# Patient Record
Sex: FEMALE | Race: WHITE | Employment: UNEMPLOYED | URBAN - METROPOLITAN AREA
[De-identification: names, ages, dates, MRNs, and addresses within clinical notes are randomized per-mention and may not be internally consistent; named-entity substitution may affect disease eponyms.]

---

## 2017-04-20 PROBLEM — R42 VERTIGO: Status: ACTIVE | Noted: 2017-04-20

## 2017-04-20 PROBLEM — S00.411A ABRASION OF RIGHT EAR CANAL: Status: ACTIVE | Noted: 2017-04-20

## 2017-05-04 PROBLEM — Z00.01 ENCOUNTER FOR GENERAL ADULT MEDICAL EXAMINATION WITH ABNORMAL FINDINGS: Status: ACTIVE | Noted: 2017-05-04

## 2017-05-04 PROBLEM — L23.7 ALLERGIC CONTACT DERMATITIS DUE TO PLANTS, EXCEPT FOOD: Status: ACTIVE | Noted: 2017-05-04

## 2018-08-24 ENCOUNTER — OFFICE VISIT (OUTPATIENT)
Dept: FAMILY MEDICINE CLINIC | Age: 58
End: 2018-08-24
Payer: COMMERCIAL

## 2018-08-24 VITALS
TEMPERATURE: 98.2 F | SYSTOLIC BLOOD PRESSURE: 124 MMHG | HEIGHT: 62 IN | DIASTOLIC BLOOD PRESSURE: 78 MMHG | RESPIRATION RATE: 16 BRPM | WEIGHT: 154 LBS | HEART RATE: 84 BPM | OXYGEN SATURATION: 96 % | BODY MASS INDEX: 28.34 KG/M2

## 2018-08-24 DIAGNOSIS — J01.00 ACUTE NON-RECURRENT MAXILLARY SINUSITIS: Primary | ICD-10-CM

## 2018-08-24 DIAGNOSIS — L73.9 FOLLICULITIS: ICD-10-CM

## 2018-08-24 PROCEDURE — 99213 OFFICE O/P EST LOW 20 MIN: CPT | Performed by: FAMILY MEDICINE

## 2018-08-24 RX ORDER — CEFDINIR 300 MG/1
600 CAPSULE ORAL DAILY
Qty: 20 CAPSULE | Refills: 0 | Status: SHIPPED | OUTPATIENT
Start: 2018-08-24 | End: 2018-08-24 | Stop reason: SDUPTHER

## 2018-08-24 RX ORDER — CEFDINIR 300 MG/1
600 CAPSULE ORAL DAILY
Qty: 20 CAPSULE | Refills: 0 | Status: SHIPPED | OUTPATIENT
Start: 2018-08-24 | End: 2018-09-03

## 2018-08-24 RX ORDER — VENLAFAXINE HYDROCHLORIDE 75 MG/1
75 CAPSULE, EXTENDED RELEASE ORAL DAILY
Qty: 90 CAPSULE | Refills: 3 | Status: SHIPPED | OUTPATIENT
Start: 2018-08-24 | End: 2019-08-13 | Stop reason: SDUPTHER

## 2018-08-24 ASSESSMENT — ENCOUNTER SYMPTOMS
SINUS PRESSURE: 1
DIARRHEA: 0
SHORTNESS OF BREATH: 0
EYE REDNESS: 0
VOMITING: 0
CONSTIPATION: 0
ABDOMINAL PAIN: 0
WHEEZING: 0
APNEA: 0
RHINORRHEA: 0
BLOOD IN STOOL: 0
BACK PAIN: 0
CHEST TIGHTNESS: 0
COUGH: 1
NAUSEA: 0
SORE THROAT: 0
COLOR CHANGE: 0
SWOLLEN GLANDS: 0
EYE ITCHING: 0
EYE PAIN: 0
SINUS COMPLAINT: 1

## 2018-08-24 ASSESSMENT — PATIENT HEALTH QUESTIONNAIRE - PHQ9
SUM OF ALL RESPONSES TO PHQ9 QUESTIONS 1 & 2: 0
SUM OF ALL RESPONSES TO PHQ QUESTIONS 1-9: 0
SUM OF ALL RESPONSES TO PHQ QUESTIONS 1-9: 0
1. LITTLE INTEREST OR PLEASURE IN DOING THINGS: 0
2. FEELING DOWN, DEPRESSED OR HOPELESS: 0

## 2018-08-24 NOTE — PROGRESS NOTES
Chief Complaint:     Chief Complaint   Patient presents with    Sinus Problem     have been bothering her for weeks.  Mass     under right arm, just noticed it today         Sinus Problem   This is a new problem. The current episode started in the past 7 days. The problem is unchanged. There has been no fever. The pain is mild. Associated symptoms include congestion, coughing and sinus pressure. Pertinent negatives include no ear pain, headaches, neck pain, shortness of breath, sore throat or swollen glands. Past treatments include nothing. The treatment provided no relief. Other   This is a new (lump on skin under right armpit) problem. The current episode started today. The problem has been unchanged. Associated symptoms include congestion and coughing. Pertinent negatives include no abdominal pain, arthralgias, chest pain, fatigue, fever, headaches, myalgias, nausea, neck pain, numbness, rash, sore throat, swollen glands, vomiting or weakness. Nothing aggravates the symptoms. She has tried nothing for the symptoms. The treatment provided no relief.        Patient Active Problem List   Diagnosis    Acute non-recurrent maxillary sinusitis    Vertigo    Abrasion of right ear canal    Encounter for general adult medical examination with abnormal findings    Allergic contact dermatitis due to plants, except food    Folliculitis       Past Medical History:   Diagnosis Date    Hyperlipidemia     Nicotine dependence     Palpitations        Past Surgical History:   Procedure Laterality Date    EYE SURGERY Left 11/02/2016       Current Outpatient Prescriptions   Medication Sig Dispense Refill    cefdinir (OMNICEF) 300 MG capsule Take 2 capsules by mouth daily for 10 days 20 capsule 0    venlafaxine (EFFEXOR XR) 75 MG extended release capsule Take 1 capsule by mouth daily 90 capsule 3    NALTREXONE HCL PO Take 4.5 mg by mouth daily      meclizine (ANTIVERT) 25 MG tablet Take 25 mg by mouth 3 times daily as needed       No current facility-administered medications for this visit. No Known Allergies    Social History     Social History    Marital status:      Spouse name: N/A    Number of children: N/A    Years of education: N/A     Social History Main Topics    Smoking status: Current Every Day Smoker     Packs/day: 2.00     Years: 30.00    Smokeless tobacco: Never Used    Alcohol use None    Drug use: Unknown    Sexual activity: Not Asked     Other Topics Concern    None     Social History Narrative    None       History reviewed. No pertinent family history. Review of Systems   Constitutional: Negative for activity change, appetite change, fatigue and fever. HENT: Positive for congestion and sinus pressure. Negative for ear pain, hearing loss, nosebleeds, rhinorrhea and sore throat. Eyes: Negative for pain, redness, itching and visual disturbance. Respiratory: Positive for cough. Negative for apnea, chest tightness, shortness of breath and wheezing. Cardiovascular: Negative for chest pain, palpitations and leg swelling. Gastrointestinal: Negative for abdominal pain, blood in stool, constipation, diarrhea, nausea and vomiting. Endocrine: Negative. Genitourinary: Negative for decreased urine volume, difficulty urinating, dysuria, frequency, hematuria and urgency. Musculoskeletal: Negative for arthralgias, back pain, gait problem, myalgias and neck pain. Skin: Negative for color change and rash. Allergic/Immunologic: Negative for environmental allergies and food allergies. Neurological: Negative for dizziness, weakness, light-headedness, numbness and headaches. Hematological: Negative for adenopathy. Does not bruise/bleed easily. Psychiatric/Behavioral: Negative for behavioral problems, dysphoric mood and sleep disturbance. The patient is not nervous/anxious and is not hyperactive. All other systems reviewed and are negative.         /78   Pulse 84 Temp 98.2 °F (36.8 °C)   Resp 16   Ht 5' 2\" (1.575 m)   Wt 154 lb (69.9 kg)   SpO2 96%   BMI 28.17 kg/m²     Physical Exam   Constitutional: She is oriented to person, place, and time. She appears well-developed and well-nourished. HENT:   Head: Normocephalic and atraumatic. Right Ear: External ear normal.   Left Ear: External ear normal.   Nose: Nose normal.   Mouth/Throat: Oropharynx is clear and moist.   Eyes: Pupils are equal, round, and reactive to light. Conjunctivae and EOM are normal. No scleral icterus. Neck: Normal range of motion. Neck supple. No thyromegaly present. Cardiovascular: Normal rate, regular rhythm and normal heart sounds. No murmur heard. Pulmonary/Chest: Effort normal and breath sounds normal. No respiratory distress. She has no wheezes. She has no rales. Abdominal: Soft. Bowel sounds are normal. She exhibits no distension. There is no tenderness. Musculoskeletal: Normal range of motion. She exhibits no edema or tenderness. Lymphadenopathy:     She has no cervical adenopathy. Neurological: She is alert and oriented to person, place, and time. She has normal reflexes. No cranial nerve deficit. Skin: Skin is warm and dry. No rash noted. No erythema. Psychiatric: She has a normal mood and affect. Nursing note and vitals reviewed. ASSESSMENT/PLAN:    Patient Active Problem List   Diagnosis    Acute non-recurrent maxillary sinusitis    Vertigo    Abrasion of right ear canal    Encounter for general adult medical examination with abnormal findings    Allergic contact dermatitis due to plants, except food    Folliculitis       Geovanny Geller was seen today for sinus problem and mass. Diagnoses and all orders for this visit:    Acute non-recurrent maxillary sinusitis    Folliculitis    Other orders  -     cefdinir (OMNICEF) 300 MG capsule;  Take 2 capsules by mouth daily for 10 days          Return if symptoms worsen or fail to improve.         Cristobal Hernandes DO  8/24/2018  12:15 PM

## 2018-09-07 ENCOUNTER — OFFICE VISIT (OUTPATIENT)
Dept: FAMILY MEDICINE CLINIC | Age: 58
End: 2018-09-07
Payer: COMMERCIAL

## 2018-09-07 VITALS
HEIGHT: 62 IN | OXYGEN SATURATION: 96 % | WEIGHT: 154 LBS | RESPIRATION RATE: 20 BRPM | SYSTOLIC BLOOD PRESSURE: 132 MMHG | HEART RATE: 75 BPM | BODY MASS INDEX: 28.34 KG/M2 | TEMPERATURE: 98.1 F | DIASTOLIC BLOOD PRESSURE: 80 MMHG

## 2018-09-07 DIAGNOSIS — J01.00 ACUTE NON-RECURRENT MAXILLARY SINUSITIS: Primary | ICD-10-CM

## 2018-09-07 PROCEDURE — 99213 OFFICE O/P EST LOW 20 MIN: CPT | Performed by: FAMILY MEDICINE

## 2018-09-07 RX ORDER — ROSUVASTATIN CALCIUM 20 MG/1
20 TABLET, COATED ORAL DAILY
COMMUNITY
End: 2019-04-23

## 2018-09-07 RX ORDER — PREDNISONE 10 MG/1
TABLET ORAL
Qty: 18 TABLET | Refills: 0 | Status: SHIPPED | OUTPATIENT
Start: 2018-09-07 | End: 2019-04-23

## 2018-09-07 RX ORDER — LEVOFLOXACIN 500 MG/1
500 TABLET, FILM COATED ORAL DAILY
Qty: 10 TABLET | Refills: 0 | Status: SHIPPED | OUTPATIENT
Start: 2018-09-07 | End: 2018-09-17

## 2018-09-07 ASSESSMENT — ENCOUNTER SYMPTOMS
DIARRHEA: 0
BLOOD IN STOOL: 0
SINUS PAIN: 1
COLOR CHANGE: 0
SORE THROAT: 0
SINUS PRESSURE: 0
RHINORRHEA: 1
NAUSEA: 0
COUGH: 1
CONSTIPATION: 0
EYE REDNESS: 0
APNEA: 0
BACK PAIN: 0
ABDOMINAL PAIN: 0
SHORTNESS OF BREATH: 0
VOMITING: 0
EYE PAIN: 0
EYE ITCHING: 0
CHEST TIGHTNESS: 0
WHEEZING: 0

## 2018-09-07 NOTE — PROGRESS NOTES
Chief Complaint:     Chief Complaint   Patient presents with    URI     not feeling any better after antibiotic but lump under arm is gone         URI    This is a recurrent problem. The current episode started 1 to 4 weeks ago. The problem has been waxing and waning. There has been no fever. Associated symptoms include congestion, coughing, rhinorrhea and sinus pain. Pertinent negatives include no abdominal pain, chest pain, diarrhea, dysuria, ear pain, headaches, nausea, neck pain, rash, sore throat, vomiting or wheezing. She has tried nothing for the symptoms. The treatment provided no relief. Patient Active Problem List   Diagnosis    Acute non-recurrent maxillary sinusitis    Vertigo    Abrasion of right ear canal    Encounter for general adult medical examination with abnormal findings    Allergic contact dermatitis due to plants, except food    Folliculitis       Past Medical History:   Diagnosis Date    Hyperlipidemia     Nicotine dependence     Palpitations        Past Surgical History:   Procedure Laterality Date    EYE SURGERY Left 11/02/2016       Current Outpatient Prescriptions   Medication Sig Dispense Refill    rosuvastatin (CRESTOR) 20 MG tablet Take 20 mg by mouth daily      levofloxacin (LEVAQUIN) 500 MG tablet Take 1 tablet by mouth daily for 10 days 10 tablet 0    predniSONE (DELTASONE) 10 MG tablet 30mg daily x3 days, then 20mg daily x3 days, then 10mg daily x3 days 18 tablet 0    venlafaxine (EFFEXOR XR) 75 MG extended release capsule Take 1 capsule by mouth daily 90 capsule 3    NALTREXONE HCL PO Take 4.5 mg by mouth daily      meclizine (ANTIVERT) 25 MG tablet Take 25 mg by mouth 3 times daily as needed       No current facility-administered medications for this visit.         No Known Allergies    Social History     Social History    Marital status:      Spouse name: N/A    Number of children: N/A    Years of education: N/A     Social History Main Topics    Smoking status: Current Every Day Smoker     Packs/day: 2.00     Years: 30.00    Smokeless tobacco: Never Used    Alcohol use None    Drug use: Unknown    Sexual activity: Not Asked     Other Topics Concern    None     Social History Narrative    None       History reviewed. No pertinent family history. Review of Systems   Constitutional: Negative for activity change, appetite change, fatigue and fever. HENT: Positive for congestion, rhinorrhea and sinus pain. Negative for ear pain, hearing loss, nosebleeds, sinus pressure and sore throat. Eyes: Negative for pain, redness, itching and visual disturbance. Respiratory: Positive for cough. Negative for apnea, chest tightness, shortness of breath and wheezing. Cardiovascular: Negative for chest pain, palpitations and leg swelling. Gastrointestinal: Negative for abdominal pain, blood in stool, constipation, diarrhea, nausea and vomiting. Endocrine: Negative. Genitourinary: Negative for decreased urine volume, difficulty urinating, dysuria, frequency, hematuria and urgency. Musculoskeletal: Negative for arthralgias, back pain, gait problem, myalgias and neck pain. Skin: Negative for color change and rash. Allergic/Immunologic: Negative for environmental allergies and food allergies. Neurological: Negative for dizziness, weakness, light-headedness, numbness and headaches. Hematological: Negative for adenopathy. Does not bruise/bleed easily. Psychiatric/Behavioral: Negative for behavioral problems, dysphoric mood and sleep disturbance. The patient is not nervous/anxious and is not hyperactive. All other systems reviewed and are negative. /80   Pulse 75   Temp 98.1 °F (36.7 °C) (Oral)   Resp 20   Ht 5' 2\" (1.575 m)   Wt 154 lb (69.9 kg)   SpO2 96%   BMI 28.17 kg/m²     Physical Exam   Constitutional: She appears well-developed and well-nourished. Non-toxic appearance.    HENT:   Head: Normocephalic and atraumatic. Right Ear: Ear canal normal. A middle ear effusion is present. Left Ear: Ear canal normal. A middle ear effusion is present. Nose: Mucosal edema, rhinorrhea and sinus tenderness present. No nasal deformity or septal deviation. No foreign bodies. Right sinus exhibits maxillary sinus tenderness. Left sinus exhibits maxillary sinus tenderness. Mouth/Throat: Uvula is midline. Mucous membranes are not pale and not dry. Posterior oropharyngeal erythema present. No oropharyngeal exudate or tonsillar abscesses. Eyes: Conjunctivae and EOM are normal. Right eye exhibits no discharge. Left eye exhibits no discharge. Right conjunctiva is not injected. Left conjunctiva is not injected. Right eye exhibits normal extraocular motion. Left eye exhibits normal extraocular motion. Neck: Normal range of motion. Neck supple. Cardiovascular: Normal rate, regular rhythm and normal heart sounds. No murmur heard. Pulmonary/Chest: Effort normal. No respiratory distress. She has no wheezes. She has no rales. She exhibits no tenderness. Abdominal: Soft. Bowel sounds are normal. There is no tenderness. Lymphadenopathy:     She has no cervical adenopathy. Neurological: She is alert. Skin: Skin is warm and dry. No rash noted. No erythema. Nursing note and vitals reviewed. ASSESSMENT/PLAN:    Patient Active Problem List   Diagnosis    Acute non-recurrent maxillary sinusitis    Vertigo    Abrasion of right ear canal    Encounter for general adult medical examination with abnormal findings    Allergic contact dermatitis due to plants, except food    Folliculitis       Andrés Mcfarland was seen today for uri. Diagnoses and all orders for this visit:    Acute non-recurrent maxillary sinusitis    Other orders  -     levofloxacin (LEVAQUIN) 500 MG tablet;  Take 1 tablet by mouth daily for 10 days  -     predniSONE (DELTASONE) 10 MG tablet; 30mg daily x3 days, then 20mg daily x3 days, then 10mg daily x3 days          Return in about 1 month (around 10/7/2018) for [address] labs for cholesterol.         Brendan Man DO  9/7/2018  10:24 AM

## 2018-09-26 PROBLEM — Z00.01 ENCOUNTER FOR GENERAL ADULT MEDICAL EXAMINATION WITH ABNORMAL FINDINGS: Status: RESOLVED | Noted: 2017-05-04 | Resolved: 2018-09-26

## 2019-04-23 ENCOUNTER — OFFICE VISIT (OUTPATIENT)
Dept: FAMILY MEDICINE CLINIC | Age: 59
End: 2019-04-23
Payer: COMMERCIAL

## 2019-04-23 VITALS
WEIGHT: 167 LBS | BODY MASS INDEX: 30.73 KG/M2 | HEIGHT: 62 IN | OXYGEN SATURATION: 96 % | DIASTOLIC BLOOD PRESSURE: 88 MMHG | TEMPERATURE: 97.8 F | SYSTOLIC BLOOD PRESSURE: 134 MMHG | HEART RATE: 80 BPM

## 2019-04-23 DIAGNOSIS — J01.00 ACUTE NON-RECURRENT MAXILLARY SINUSITIS: Primary | ICD-10-CM

## 2019-04-23 DIAGNOSIS — Z12.11 SCREENING FOR MALIGNANT NEOPLASM OF COLON: ICD-10-CM

## 2019-04-23 DIAGNOSIS — Z12.31 ENCOUNTER FOR SCREENING MAMMOGRAM FOR MALIGNANT NEOPLASM OF BREAST: ICD-10-CM

## 2019-04-23 PROCEDURE — 99213 OFFICE O/P EST LOW 20 MIN: CPT | Performed by: FAMILY MEDICINE

## 2019-04-23 RX ORDER — CEFDINIR 300 MG/1
600 CAPSULE ORAL DAILY
Qty: 20 CAPSULE | Refills: 0 | Status: SHIPPED | OUTPATIENT
Start: 2019-04-23 | End: 2019-05-03

## 2019-04-23 RX ORDER — PREDNISONE 10 MG/1
TABLET ORAL
Qty: 18 TABLET | Refills: 0 | Status: SHIPPED | OUTPATIENT
Start: 2019-04-23 | End: 2019-08-13

## 2019-04-23 ASSESSMENT — ENCOUNTER SYMPTOMS
SINUS PRESSURE: 1
NAUSEA: 0
VOMITING: 0
SHORTNESS OF BREATH: 0
COUGH: 1
DIARRHEA: 0
SORE THROAT: 1
RHINORRHEA: 1
EYE REDNESS: 0
WHEEZING: 0

## 2019-04-23 ASSESSMENT — PATIENT HEALTH QUESTIONNAIRE - PHQ9
SUM OF ALL RESPONSES TO PHQ QUESTIONS 1-9: 0
2. FEELING DOWN, DEPRESSED OR HOPELESS: 0
1. LITTLE INTEREST OR PLEASURE IN DOING THINGS: 0
SUM OF ALL RESPONSES TO PHQ QUESTIONS 1-9: 0
SUM OF ALL RESPONSES TO PHQ9 QUESTIONS 1 & 2: 0

## 2019-04-23 NOTE — PROGRESS NOTES
Chief Complaint:     Chief Complaint   Patient presents with    Cough    Otalgia     right ear, onset about a week    Headache    Fever         Cough   This is a new problem. The current episode started in the past 7 days. The problem has been unchanged. The cough is non-productive. Associated symptoms include ear congestion, ear pain, a fever, headaches, nasal congestion, postnasal drip, rhinorrhea and a sore throat. Pertinent negatives include no chills, eye redness, rash, shortness of breath or wheezing. She has tried OTC cough suppressant for the symptoms. The treatment provided no relief. Otalgia    There is pain in both ears. This is a new problem. The current episode started in the past 7 days. The problem has been unchanged. The maximum temperature recorded prior to her arrival was 100.4 - 100.9 F. Associated symptoms include coughing, headaches, rhinorrhea and a sore throat. Pertinent negatives include no diarrhea, ear discharge, rash or vomiting. She has tried nothing for the symptoms. The treatment provided no relief.        Patient Active Problem List   Diagnosis    Acute non-recurrent maxillary sinusitis    Vertigo    Abrasion of right ear canal    Allergic contact dermatitis due to plants, except food    Folliculitis       Past Medical History:   Diagnosis Date    Hyperlipidemia     Nicotine dependence     Palpitations        Past Surgical History:   Procedure Laterality Date    EYE SURGERY Left 11/02/2016       Current Outpatient Medications   Medication Sig Dispense Refill    cefdinir (OMNICEF) 300 MG capsule Take 2 capsules by mouth daily for 10 days 20 capsule 0    predniSONE (DELTASONE) 10 MG tablet 30mg daily x3 days, then 20mg daily x3 days, then 10mg daily x3 days 18 tablet 0    rosuvastatin (CRESTOR) 20 MG tablet TAKE 1 TABLET BY MOUTH EVERY DAY AT NIGHT 90 tablet 2    venlafaxine (EFFEXOR XR) 75 MG extended release capsule Take 1 capsule by mouth daily 90 capsule 3    meclizine (ANTIVERT) 25 MG tablet Take 25 mg by mouth 3 times daily as needed      NALTREXONE HCL PO Take 4.5 mg by mouth daily       No current facility-administered medications for this visit. No Known Allergies    Social History     Socioeconomic History    Marital status:      Spouse name: None    Number of children: None    Years of education: None    Highest education level: None   Occupational History     Employer: NOT EMPLOYED     Employer: DISABLED   Social Needs    Financial resource strain: None    Food insecurity:     Worry: None     Inability: None    Transportation needs:     Medical: None     Non-medical: None   Tobacco Use    Smoking status: Current Every Day Smoker     Packs/day: 2.00     Years: 30.00     Pack years: 60.00    Smokeless tobacco: Never Used   Substance and Sexual Activity    Alcohol use: Not Currently    Drug use: Never    Sexual activity: Yes     Partners: Male   Lifestyle    Physical activity:     Days per week: None     Minutes per session: None    Stress: None   Relationships    Social connections:     Talks on phone: None     Gets together: None     Attends [employer] service: None     Active member of club or organization: None     Attends meetings of clubs or organizations: None     Relationship status: None    Intimate partner violence:     Fear of current or ex partner: None     Emotionally abused: None     Physically abused: None     Forced sexual activity: None   Other Topics Concern    None   Social History Narrative    None       History reviewed. No pertinent family history. Review of Systems   Constitutional: Positive for fever. Negative for chills. HENT: Positive for congestion, ear pain, postnasal drip, rhinorrhea, sinus pressure and sore throat. Negative for ear discharge. Eyes: Negative for redness. Respiratory: Positive for cough. Negative for shortness of breath and wheezing.     Gastrointestinal: Negative for diarrhea, dermatitis due to plants, except food    Folliculitis       Trish Gottlieb was seen today for cough, otalgia, headache and fever. Diagnoses and all orders for this visit:    Acute non-recurrent maxillary sinusitis    Screening for malignant neoplasm of colon  -     Cologuard (For External Results Only); Future    Encounter for screening mammogram for malignant neoplasm of breast  -     LUTHER DIGITAL SCREEN W CAD BILATERAL; Future    Other orders  -     cefdinir (OMNICEF) 300 MG capsule; Take 2 capsules by mouth daily for 10 days  -     predniSONE (DELTASONE) 10 MG tablet; 30mg daily x3 days, then 20mg daily x3 days, then 10mg daily x3 days          Return if symptoms worsen or fail to improve.         Pamela Núñez DO  4/23/2019  10:32 AM

## 2019-06-18 ENCOUNTER — TELEPHONE (OUTPATIENT)
Dept: FAMILY MEDICINE CLINIC | Age: 59
End: 2019-06-18

## 2019-06-18 NOTE — TELEPHONE ENCOUNTER
6/18/19 spoke to patient regarding her overdue mammogram. She states that right now ids not a good time for her as her mother just had major surgery and had to move in with her.  She wants to cancel it for now and will call back once things have gotten back to normal

## 2019-08-13 ENCOUNTER — OFFICE VISIT (OUTPATIENT)
Dept: PRIMARY CARE CLINIC | Age: 59
End: 2019-08-13
Payer: COMMERCIAL

## 2019-08-13 VITALS
WEIGHT: 156 LBS | HEART RATE: 77 BPM | DIASTOLIC BLOOD PRESSURE: 74 MMHG | BODY MASS INDEX: 28.71 KG/M2 | RESPIRATION RATE: 16 BRPM | OXYGEN SATURATION: 96 % | SYSTOLIC BLOOD PRESSURE: 136 MMHG | HEIGHT: 62 IN

## 2019-08-13 DIAGNOSIS — R42 VERTIGO: Primary | ICD-10-CM

## 2019-08-13 DIAGNOSIS — L72.3 SEBACEOUS CYST: ICD-10-CM

## 2019-08-13 PROBLEM — L73.9 FOLLICULITIS: Status: RESOLVED | Noted: 2018-08-24 | Resolved: 2019-08-13

## 2019-08-13 PROBLEM — L23.7 ALLERGIC CONTACT DERMATITIS DUE TO PLANTS, EXCEPT FOOD: Status: RESOLVED | Noted: 2017-05-04 | Resolved: 2019-08-13

## 2019-08-13 PROBLEM — S00.411A ABRASION OF RIGHT EAR CANAL: Status: RESOLVED | Noted: 2017-04-20 | Resolved: 2019-08-13

## 2019-08-13 PROCEDURE — 99214 OFFICE O/P EST MOD 30 MIN: CPT | Performed by: FAMILY MEDICINE

## 2019-08-13 RX ORDER — ACETAZOLAMIDE 250 MG/1
250 TABLET ORAL 2 TIMES DAILY
Qty: 180 TABLET | Refills: 1 | Status: SHIPPED | OUTPATIENT
Start: 2019-08-13 | End: 2019-08-13 | Stop reason: SDUPTHER

## 2019-08-13 RX ORDER — VENLAFAXINE HYDROCHLORIDE 75 MG/1
75 CAPSULE, EXTENDED RELEASE ORAL DAILY
Qty: 30 CAPSULE | Refills: 1 | Status: SHIPPED | OUTPATIENT
Start: 2019-08-13

## 2019-08-13 RX ORDER — VENLAFAXINE HYDROCHLORIDE 75 MG/1
75 CAPSULE, EXTENDED RELEASE ORAL DAILY
Qty: 90 CAPSULE | Refills: 1 | Status: SHIPPED | OUTPATIENT
Start: 2019-08-13 | End: 2019-08-13 | Stop reason: SDUPTHER

## 2019-08-13 RX ORDER — ROSUVASTATIN CALCIUM 20 MG/1
TABLET, COATED ORAL
Qty: 90 TABLET | Refills: 1 | Status: SHIPPED | OUTPATIENT
Start: 2019-08-13 | End: 2019-08-13 | Stop reason: SDUPTHER

## 2019-08-13 RX ORDER — ACETAZOLAMIDE 250 MG/1
250 TABLET ORAL 2 TIMES DAILY
Qty: 60 TABLET | Refills: 1 | Status: SHIPPED
Start: 2019-08-13 | End: 2020-03-06 | Stop reason: SDUPTHER

## 2019-08-13 RX ORDER — ROSUVASTATIN CALCIUM 20 MG/1
TABLET, COATED ORAL
Qty: 30 TABLET | Refills: 1 | Status: SHIPPED | OUTPATIENT
Start: 2019-08-13

## 2019-08-13 ASSESSMENT — PATIENT HEALTH QUESTIONNAIRE - PHQ9
SUM OF ALL RESPONSES TO PHQ9 QUESTIONS 1 & 2: 0
1. LITTLE INTEREST OR PLEASURE IN DOING THINGS: 0
2. FEELING DOWN, DEPRESSED OR HOPELESS: 0
SUM OF ALL RESPONSES TO PHQ QUESTIONS 1-9: 0
SUM OF ALL RESPONSES TO PHQ QUESTIONS 1-9: 0

## 2019-08-13 ASSESSMENT — ENCOUNTER SYMPTOMS
SHORTNESS OF BREATH: 0
EYE ITCHING: 0
ABDOMINAL PAIN: 0
SORE THROAT: 0
COUGH: 0
APNEA: 0
CONSTIPATION: 0
DIARRHEA: 0
EYE PAIN: 0
BLOOD IN STOOL: 0
WHEEZING: 0
CHEST TIGHTNESS: 0
NAUSEA: 0
SINUS PRESSURE: 0
EYE REDNESS: 0
BACK PAIN: 0
COLOR CHANGE: 0
VOMITING: 0
RHINORRHEA: 0

## 2019-09-17 RX ORDER — VENLAFAXINE HYDROCHLORIDE 75 MG/1
CAPSULE, EXTENDED RELEASE ORAL
Qty: 90 CAPSULE | Refills: 3 | Status: SHIPPED | OUTPATIENT
Start: 2019-09-17

## 2020-03-06 RX ORDER — ACETAZOLAMIDE 250 MG/1
250 TABLET ORAL 2 TIMES DAILY
Qty: 60 TABLET | Refills: 1 | Status: SHIPPED | OUTPATIENT
Start: 2020-03-06